# Patient Record
Sex: FEMALE | Race: AMERICAN INDIAN OR ALASKA NATIVE | NOT HISPANIC OR LATINO
[De-identification: names, ages, dates, MRNs, and addresses within clinical notes are randomized per-mention and may not be internally consistent; named-entity substitution may affect disease eponyms.]

---

## 2018-07-02 ENCOUNTER — APPOINTMENT (OUTPATIENT)
Dept: ORTHOPEDIC SURGERY | Facility: CLINIC | Age: 68
End: 2018-07-02
Payer: MEDICARE

## 2018-07-02 VITALS
BODY MASS INDEX: 35.44 KG/M2 | HEIGHT: 63 IN | DIASTOLIC BLOOD PRESSURE: 85 MMHG | SYSTOLIC BLOOD PRESSURE: 143 MMHG | WEIGHT: 200 LBS

## 2018-07-02 DIAGNOSIS — Z78.9 OTHER SPECIFIED HEALTH STATUS: ICD-10-CM

## 2018-07-02 DIAGNOSIS — M17.11 UNILATERAL PRIMARY OSTEOARTHRITIS, RIGHT KNEE: ICD-10-CM

## 2018-07-02 DIAGNOSIS — Z86.79 PERSONAL HISTORY OF OTHER DISEASES OF THE CIRCULATORY SYSTEM: ICD-10-CM

## 2018-07-02 DIAGNOSIS — Z86.69 PERSONAL HISTORY OF OTHER DISEASES OF THE NERVOUS SYSTEM AND SENSE ORGANS: ICD-10-CM

## 2018-07-02 DIAGNOSIS — H91.93 UNSPECIFIED HEARING LOSS, BILATERAL: ICD-10-CM

## 2018-07-02 DIAGNOSIS — Z86.39 PERSONAL HISTORY OF OTHER ENDOCRINE, NUTRITIONAL AND METABOLIC DISEASE: ICD-10-CM

## 2018-07-02 PROCEDURE — 73564 X-RAY EXAM KNEE 4 OR MORE: CPT | Mod: RT

## 2018-07-02 PROCEDURE — 99204 OFFICE O/P NEW MOD 45 MIN: CPT

## 2018-07-02 RX ORDER — LIDOCAINE 5% 700 MG/1
5 PATCH TOPICAL
Qty: 30 | Refills: 0 | Status: ACTIVE | COMMUNITY
Start: 2018-03-21

## 2018-07-02 RX ORDER — LEVOTHYROXINE SODIUM 0.05 MG/1
50 TABLET ORAL
Qty: 30 | Refills: 0 | Status: ACTIVE | COMMUNITY
Start: 2018-02-19

## 2018-07-02 RX ORDER — METHYLPREDNISOLONE 4 MG/1
4 TABLET ORAL
Qty: 21 | Refills: 0 | Status: ACTIVE | COMMUNITY
Start: 2018-02-20

## 2018-07-02 RX ORDER — CYCLOBENZAPRINE HYDROCHLORIDE 10 MG/1
10 TABLET, FILM COATED ORAL
Qty: 20 | Refills: 0 | Status: ACTIVE | COMMUNITY
Start: 2018-02-21

## 2018-07-02 RX ORDER — AMLODIPINE BESYLATE 5 MG/1
5 TABLET ORAL
Qty: 90 | Refills: 0 | Status: ACTIVE | COMMUNITY
Start: 2018-03-28

## 2022-08-12 ENCOUNTER — APPOINTMENT (OUTPATIENT)
Dept: ORTHOPEDIC SURGERY | Facility: CLINIC | Age: 72
End: 2022-08-12

## 2022-08-12 VITALS — BODY MASS INDEX: 31.01 KG/M2 | WEIGHT: 175 LBS | HEIGHT: 63 IN

## 2022-08-12 DIAGNOSIS — Z00.00 ENCOUNTER FOR GENERAL ADULT MEDICAL EXAMINATION W/OUT ABNORMAL FINDINGS: ICD-10-CM

## 2022-08-12 PROCEDURE — 73080 X-RAY EXAM OF ELBOW: CPT | Mod: LT

## 2022-08-12 PROCEDURE — 99204 OFFICE O/P NEW MOD 45 MIN: CPT

## 2022-08-12 PROCEDURE — L3908: CPT

## 2022-08-12 PROCEDURE — 73030 X-RAY EXAM OF SHOULDER: CPT | Mod: LT

## 2022-08-12 PROCEDURE — A4565: CPT

## 2022-08-12 PROCEDURE — 73110 X-RAY EXAM OF WRIST: CPT | Mod: LT

## 2022-08-12 NOTE — PHYSICAL EXAM
[NL (150)] : flexion 150 degrees [NL (0)] : extension 0 degrees [FreeTextEntry9] : pain with pronation/supination [Left] : left hand [Distal Radius] : distal radius [NL (75)] : Dorsiflexion 75 degrees [NL (85)] : volarflexion 85 degrees [] : no instability at DRUJ

## 2022-08-12 NOTE — ASSESSMENT
[FreeTextEntry1] : XR with minimally displaced radial head fx\par Sling for comfort as well as L wrist brace\par Ice, rest, nsaids\par FU 1 week for XR

## 2022-08-12 NOTE — HISTORY OF PRESENT ILLNESS
[9] : 9 [de-identified] : 71 LHD female here with L elbow, L wrist and L shoulder pain. She fell 8/11 and landed on her left side. Wrist and elbow most severe of her pain. No prior wrist or elbow issues.  [FreeTextEntry5] : pt fell last night on her lt side, pt c.o pain in lt wrist and lt shoulder \par

## 2022-08-12 NOTE — IMAGING
[Fracture] : Fracture [There are no fractures, subluxations or dislocations. No significant abnormalities are seen] : There are no fractures, subluxations or dislocations. No significant abnormalities are seen [Left] : left wrist

## 2022-08-15 ENCOUNTER — APPOINTMENT (OUTPATIENT)
Dept: ORTHOPEDIC SURGERY | Facility: CLINIC | Age: 72
End: 2022-08-15

## 2022-08-16 ENCOUNTER — APPOINTMENT (OUTPATIENT)
Dept: ORTHOPEDIC SURGERY | Facility: CLINIC | Age: 72
End: 2022-08-16

## 2022-08-16 VITALS — HEIGHT: 72 IN | BODY MASS INDEX: 23.84 KG/M2 | WEIGHT: 176 LBS

## 2022-08-16 DIAGNOSIS — S52.122A DISPLACED FRACTURE OF HEAD OF LEFT RADIUS, INITIAL ENCOUNTER FOR CLOSED FRACTURE: ICD-10-CM

## 2022-08-16 DIAGNOSIS — S63.502A UNSPECIFIED SPRAIN OF LEFT WRIST, INITIAL ENCOUNTER: ICD-10-CM

## 2022-08-16 PROCEDURE — 99214 OFFICE O/P EST MOD 30 MIN: CPT | Mod: 57

## 2022-08-16 NOTE — HISTORY OF PRESENT ILLNESS
[Gradual] : gradual [9] : 9 [Dull/Aching] : dull/aching [Sharp] : sharp [Stabbing] : stabbing [Throbbing] : throbbing [Constant] : constant [Nothing helps with pain getting better] : Nothing helps with pain getting better [Retired] : Work status: retired [de-identified] : 71 year old female with LEFT wrist and elbow  pain.   Was seen at urgent care and had xrays.  Was placed in sling and wrist splint\par DOI: 8/11/22 [] : no [FreeTextEntry5] : pt states she fell and hurt her left hand on 08/11/2022 [FreeTextEntry7] : left shoulder  [de-identified] : activity

## 2022-08-16 NOTE — PHYSICAL EXAM
[Left] : left elbow [Outside films reviewed] : Outside films reviewed [Fracture] : Fracture [] : no erythema [FreeTextEntry9] : supination 45, pronation 60, elbow ROM 40 - 120  [FreeTextEntry8] : displaced radial neck fx  [de-identified] : False [FreeTextEntry3] : MIld Distal radius tenderness, DRUJ and TFCC tenderness.  No DRUJ instabuility

## 2022-08-23 ENCOUNTER — FORM ENCOUNTER (OUTPATIENT)
Age: 72
End: 2022-08-23

## 2022-08-23 ENCOUNTER — APPOINTMENT (OUTPATIENT)
Dept: ORTHOPEDIC SURGERY | Facility: CLINIC | Age: 72
End: 2022-08-23

## 2022-08-23 VITALS — HEIGHT: 63 IN | BODY MASS INDEX: 31.18 KG/M2 | WEIGHT: 176 LBS

## 2022-08-23 PROCEDURE — 73080 X-RAY EXAM OF ELBOW: CPT | Mod: LT

## 2022-08-23 PROCEDURE — 99024 POSTOP FOLLOW-UP VISIT: CPT

## 2022-08-23 NOTE — PHYSICAL EXAM
[Left] : left elbow [The fracture is in acceptable alignment. There is progression in healing seen] : The fracture is in acceptable alignment. There is progression in healing seen [FreeTextEntry9] : supiantion 45 / pronation 70/ Elbow ROM 40 - 130  [FreeTextEntry1] : lose body, possible coronoid fx

## 2022-08-23 NOTE — HISTORY OF PRESENT ILLNESS
[6] : 6 [5] : 5 [Dull/Aching] : dull/aching [de-identified] : 71 year old female followed for Closed displaced fracture of head of left radius and a LEFT wrist sprain. Has been splinting the wrist. \par DOI: 8/11/22  [FreeTextEntry1] : L hand [de-identified] : ice,meds,sling,brace

## 2022-08-24 ENCOUNTER — APPOINTMENT (OUTPATIENT)
Dept: CT IMAGING | Facility: CLINIC | Age: 72
End: 2022-08-24

## 2022-08-24 PROCEDURE — 73200 CT UPPER EXTREMITY W/O DYE: CPT | Mod: LT,MH

## 2022-08-24 PROCEDURE — 76376 3D RENDER W/INTRP POSTPROCES: CPT | Mod: LT

## 2022-09-13 ENCOUNTER — APPOINTMENT (OUTPATIENT)
Dept: ORTHOPEDIC SURGERY | Facility: CLINIC | Age: 72
End: 2022-09-13

## 2022-09-13 DIAGNOSIS — S63.502D UNSPECIFIED SPRAIN OF LEFT WRIST, SUBSEQUENT ENCOUNTER: ICD-10-CM

## 2022-09-13 DIAGNOSIS — S46.912A STRAIN OF UNSPECIFIED MUSCLE, FASCIA AND TENDON AT SHOULDER AND UPPER ARM LEVEL, LEFT ARM, INITIAL ENCOUNTER: ICD-10-CM

## 2022-09-13 PROCEDURE — 73030 X-RAY EXAM OF SHOULDER: CPT | Mod: LT

## 2022-09-13 PROCEDURE — 73080 X-RAY EXAM OF ELBOW: CPT | Mod: LT

## 2022-09-13 PROCEDURE — 99024 POSTOP FOLLOW-UP VISIT: CPT

## 2022-09-13 NOTE — PHYSICAL EXAM
[Left] : left elbow [The fracture is in acceptable alignment. There is progression in healing seen] : The fracture is in acceptable alignment. There is progression in healing seen [] : no erythema [FreeTextEntry9] : ROM 20 - 125

## 2022-09-13 NOTE — DATA REVIEWED
[CT Scan] : CT scan [Left] : left [Elbow] : elbow [I reviewed the films/CD and agree] : I reviewed the films/CD and agree [FreeTextEntry1] : 1. 3 x 9 x 2 mm minimally displaced fracture of the tip of the coronoid process.\par 2. Nondisplaced, intra-articular fracture of the radial head and neck in a Y-shaped orientation extending to the \par articular surface.\par 3. No subluxation or dislocation.\par 4. Moderate-sized joint effusion.

## 2022-09-13 NOTE — HISTORY OF PRESENT ILLNESS
[de-identified] : 71 year old female followed for Closed displaced fracture of head of left radius and a LEFT wrist sprain. Has been splinting the wrist. [facility] \par DOI: 8/11/22

## 2022-10-11 ENCOUNTER — APPOINTMENT (OUTPATIENT)
Dept: ORTHOPEDIC SURGERY | Facility: CLINIC | Age: 72
End: 2022-10-11

## 2022-11-08 ENCOUNTER — APPOINTMENT (OUTPATIENT)
Dept: ORTHOPEDIC SURGERY | Facility: CLINIC | Age: 72
End: 2022-11-08

## 2022-11-08 ENCOUNTER — APPOINTMENT (OUTPATIENT)
Dept: ORTHOPEDIC SURGERY | Facility: CLINIC | Age: 72
End: 2022-11-08
Payer: MEDICARE

## 2022-11-08 ENCOUNTER — NON-APPOINTMENT (OUTPATIENT)
Age: 72
End: 2022-11-08

## 2022-11-08 VITALS — BODY MASS INDEX: 35.44 KG/M2 | WEIGHT: 200 LBS | HEIGHT: 63 IN

## 2022-11-08 DIAGNOSIS — M19.90 UNSPECIFIED OSTEOARTHRITIS, UNSPECIFIED SITE: ICD-10-CM

## 2022-11-08 PROCEDURE — 99024 POSTOP FOLLOW-UP VISIT: CPT

## 2022-11-08 PROCEDURE — 73080 X-RAY EXAM OF ELBOW: CPT | Mod: LT

## 2022-12-20 ENCOUNTER — APPOINTMENT (OUTPATIENT)
Dept: ORTHOPEDIC SURGERY | Facility: CLINIC | Age: 72
End: 2022-12-20

## 2022-12-20 VITALS — HEIGHT: 63 IN | WEIGHT: 200 LBS | BODY MASS INDEX: 35.44 KG/M2

## 2022-12-20 PROCEDURE — 99213 OFFICE O/P EST LOW 20 MIN: CPT

## 2022-12-20 PROCEDURE — 73080 X-RAY EXAM OF ELBOW: CPT | Mod: LT

## 2022-12-20 NOTE — PHYSICAL EXAM
[Left] : left elbow [The fracture is in acceptable alignment. There is progression in healing seen] : The fracture is in acceptable alignment. There is progression in healing seen [FreeTextEntry3] : ROM 15-135Near full supination and pronation

## 2022-12-20 NOTE — HISTORY OF PRESENT ILLNESS
[8] : 8 [Dull/Aching] : dull/aching [Radiating] : radiating [Retired] : Work status: retired [de-identified] : 72 year odl female followed for LEFT radial head fratcure.  Her injury was just over 4 months ago.  She has been attending therapy.  Feels ther eis improvement.  She feels the cvold is making her elbow a liuttle stiffer [] : Post Surgical Visit: no [FreeTextEntry1] : L eblow [FreeTextEntry3] : 8/1/22 [FreeTextEntry5] : 73 Y/O LHD F eval L elbow S/P FX on above DOI pt states no noticeable change imn condition since last visit  [FreeTextEntry7] : throughout L arm  [de-identified] : PT 3x wkly  [de-identified] : [employer]

## 2023-01-09 NOTE — PHYSICAL EXAM
[Left] : left elbow [The fracture is in acceptable alignment. There is progression in healing seen] : The fracture is in acceptable alignment. There is progression in healing seen

## 2023-01-09 NOTE — HISTORY OF PRESENT ILLNESS
[Sudden] : sudden [8] : 8 [Dull/Aching] : dull/aching [Constant] : constant [Meds] : meds [Lying in bed] : lying in bed [] : no [FreeTextEntry1] : L ELBOW  [FreeTextEntry3] : 3 MONTHS  [FreeTextEntry5] : PT STATED SHE HAS BEEN HAVING IN HER LEFT ELBOW AFTER A FALL 3 MONTHS AGO PAIN IS RADIATING TO L SHOULDER  [FreeTextEntry7] : L FOREARM TO L SHOULDER  [de-identified] : 11/7/22

## 2023-01-09 NOTE — HISTORY OF PRESENT ILLNESS
[de-identified] : 71 year old female followed for a  Closed displaced fracture of head of left radius and a LEFT wrist sprain.\par DOI: 8/11/22

## 2023-01-30 ENCOUNTER — FORM ENCOUNTER (OUTPATIENT)
Age: 73
End: 2023-01-30

## 2023-01-31 ENCOUNTER — APPOINTMENT (OUTPATIENT)
Dept: ORTHOPEDIC SURGERY | Facility: CLINIC | Age: 73
End: 2023-01-31
Payer: MEDICARE

## 2023-01-31 ENCOUNTER — APPOINTMENT (OUTPATIENT)
Dept: MRI IMAGING | Facility: CLINIC | Age: 73
End: 2023-01-31
Payer: MEDICARE

## 2023-01-31 VITALS — BODY MASS INDEX: 35.44 KG/M2 | HEIGHT: 63 IN | WEIGHT: 200 LBS

## 2023-01-31 PROCEDURE — 73221 MRI JOINT UPR EXTREM W/O DYE: CPT | Mod: LT,MH

## 2023-01-31 PROCEDURE — 99213 OFFICE O/P EST LOW 20 MIN: CPT

## 2023-01-31 PROCEDURE — 73080 X-RAY EXAM OF ELBOW: CPT | Mod: LT

## 2023-01-31 NOTE — HISTORY OF PRESENT ILLNESS
[9] : 9 [8] : 8 [Dull/Aching] : dull/aching [Radiating] : radiating [Retired] : Work status: retired [de-identified] : 72 year old female followed for LEFT radial head fracture. Her injury was almost 5 months ago. She has been attending therapy. She i shaving acute exasperation of pain. Also having shoulder stiffness. \par DOI: 8/1/22 [] : Post Surgical Visit: no [FreeTextEntry1] : L eblow [FreeTextEntry3] : 8/1/22 [FreeTextEntry5] : 71 Y/O LHD F eval L elbow S/P FX on above DOI pt states pain has worsened since last visit with no associated trauma [FreeTextEntry7] : throughout L arm  [de-identified] : PT 3x wkly  [de-identified] : [employer]

## 2023-01-31 NOTE — DISCUSSION/SUMMARY
[de-identified] : As she is having persistent pain, will obtain and MRI. \par Consult with Dr. Delatorre regarding her shoulder pain and stiffness. \par RTO after MRI.

## 2023-01-31 NOTE — PHYSICAL EXAM
[Left] : left elbow [TWNoteComboBox7] : active forward flexion 120 degrees [de-identified] : active abduction 100 degrees [] : no erythema [de-identified] : lateral joint line tenderness  [FreeTextEntry9] : 35 - 929

## 2023-02-07 ENCOUNTER — APPOINTMENT (OUTPATIENT)
Dept: ORTHOPEDIC SURGERY | Facility: CLINIC | Age: 73
End: 2023-02-07
Payer: MEDICARE

## 2023-02-07 VITALS — BODY MASS INDEX: 35.44 KG/M2 | WEIGHT: 200 LBS | HEIGHT: 63 IN

## 2023-02-07 DIAGNOSIS — S52.122D DISPLACED FRACTURE OF HEAD OF LEFT RADIUS, SUBSEQUENT ENCOUNTER FOR CLOSED FRACTURE WITH ROUTINE HEALING: ICD-10-CM

## 2023-02-07 PROCEDURE — 99214 OFFICE O/P EST MOD 30 MIN: CPT | Mod: 25

## 2023-02-07 PROCEDURE — 20605 DRAIN/INJ JOINT/BURSA W/O US: CPT

## 2023-02-07 NOTE — DATA REVIEWED
[MRI] : MRI [Left] : left [Elbow] : elbow [I reviewed the films/CD and agree] : I reviewed the films/CD and agree [FreeTextEntry1] : 1. Slightly depressed comminuted fracture of the radial head with marrow edema extending into the radial neck \par with surrounding soft tissue swelling, but no significant displaced bone fragment, malalignment, or loose body \par suspected. \par 2. Small fracture suspected involving the coronoid process of the ulna with 5-6 mm displaced bone fragment at \par the tip  by 3 mm.\par 3. Mild effusion and soft tissue swelling with mild ligament sprains, lateral epicondylitis, and mild partial \par tearing and delamination of the common extensor tendon insertion which is age indeterminate.\par 4. Distal biceps tendinitis without tear. \par 5. Correlation with plain films is recommended. Additionally noted medial epicondylar degenrative changes

## 2023-02-07 NOTE — PROCEDURE
[Medium Joint Injection] : medium joint injection [Left] : of the left [Elbow Joint] : elbow joint [Pain] : pain [Inflammation] : inflammation [Alcohol] : alcohol [Ethyl Chloride sprayed topically] : ethyl chloride sprayed topically [Sterile technique used] : sterile technique used [___ cc    2%] : Lidocaine ~Vcc of 2%  [___ cc    40mg] : Triamcinolone (Kenalog) ~Vcc of 40 mg

## 2023-02-07 NOTE — DISCUSSION/SUMMARY
[de-identified] : Discussed the nature of the diagnosis and risk and benefits of different modalities of treatment.\par She would like to continue with PT.\par Discussed that she has inflammation in the elbow joint which may improve with a CSI.\par CSI done today and tolerated well.

## 2023-02-07 NOTE — HISTORY OF PRESENT ILLNESS
[de-identified] : 72 year old female followed for LEFT radial head fracture. Her injury was almost 5 months ago. She has been attending therapy. She has returned after an MRI. \par DOI: 8/1/22 \par  [FreeTextEntry1] : Left elbow  [FreeTextEntry5] : May is a 72 F here today for a follow up on the left elbow. Here for MRI results.

## 2023-06-28 ENCOUNTER — APPOINTMENT (OUTPATIENT)
Dept: OTOLARYNGOLOGY | Facility: CLINIC | Age: 73
End: 2023-06-28